# Patient Record
Sex: FEMALE | Race: WHITE | NOT HISPANIC OR LATINO | Employment: UNEMPLOYED | URBAN - METROPOLITAN AREA
[De-identification: names, ages, dates, MRNs, and addresses within clinical notes are randomized per-mention and may not be internally consistent; named-entity substitution may affect disease eponyms.]

---

## 2020-07-07 ENCOUNTER — TELEPHONE (OUTPATIENT)
Dept: OBSTETRICS AND GYNECOLOGY | Facility: CLINIC | Age: 20
End: 2020-07-07

## 2020-07-07 DIAGNOSIS — N92.6 IRREGULAR MENSES: Primary | ICD-10-CM

## 2020-07-07 NOTE — TELEPHONE ENCOUNTER
----- Message from Gilda Jara sent at 7/7/2020  1:58 PM CDT -----  Regarding: question  Contact: [phone]  Would like to consult with the nurse, patient has some Question that she needs to Ask the nurse,  please call back at [phone] our [phone], thanks sj

## 2020-07-07 NOTE — TELEPHONE ENCOUNTER
Pt states she's not supposed to start for another 11 days and she's been having a bloody like discharge, not heavy bleeding. Pt is on OCP Uziel. Pt is wondering if she could've been pregnant and having a miscarriage. Please advise.

## 2020-07-08 ENCOUNTER — TELEPHONE (OUTPATIENT)
Dept: OBSTETRICS AND GYNECOLOGY | Facility: CLINIC | Age: 20
End: 2020-07-08

## 2020-07-08 DIAGNOSIS — N92.6 IRREGULAR MENSES: Primary | ICD-10-CM

## 2020-07-08 LAB — B-HCG SERPL-ACNC: <1.59 MIU/ML

## 2020-07-08 NOTE — TELEPHONE ENCOUNTER
----- Message from Mine Barnes sent at 7/8/2020  9:47 AM CDT -----  Regarding: order request  Contact: [facility]  States that pt is at office to have labs drawn but that they do not have any orders. Please fax orders to [phone]. Please call back at [phone]//thank you acc

## 2020-07-08 NOTE — TELEPHONE ENCOUNTER
----- Message from Shabnam Bernard sent at 7/8/2020  9:59 AM CDT -----  Regarding: lab orders  Contact: Patient  Path lab never received patient's lab orders. Please call patient at Ph .[phone] (home)

## 2020-07-09 ENCOUNTER — TELEPHONE (OUTPATIENT)
Dept: OBSTETRICS AND GYNECOLOGY | Facility: CLINIC | Age: 20
End: 2020-07-09

## 2020-07-09 DIAGNOSIS — Z30.011 ENCOUNTER FOR INITIAL PRESCRIPTION OF CONTRACEPTIVE PILLS: Primary | ICD-10-CM

## 2020-07-09 RX ORDER — NORGESTIMATE AND ETHINYL ESTRADIOL 0.25-0.035
1 KIT ORAL DAILY
Qty: 28 TABLET | Refills: 1 | Status: SHIPPED | OUTPATIENT
Start: 2020-07-09 | End: 2020-08-19

## 2020-07-09 NOTE — TELEPHONE ENCOUNTER
----- Message from Minesh Hernandez sent at 7/9/2020 12:48 PM CDT -----  Contact: self  Type:  Test Results    Who Called: pt  Name of Test (Lab/Mammo/Etc): labs  Date of Test: 07/08  Ordering Provider: chris  Where the test was performed: path lab  Would the patient rather a call back or a response via [facility]? Call back  Best Call Back Number: [phone]  Additional Information:  none

## 2020-07-09 NOTE — TELEPHONE ENCOUNTER
Please notify pt that HCG was negative and that different OCP prescription sent for her to start at the end of this pack. She needs to also use condoms.

## 2020-07-09 NOTE — TELEPHONE ENCOUNTER
----- Message from Angela Bauer sent at 7/9/2020  2:47 PM CDT -----  .Type:  Test Results    Who Called: self  Name of Test (Lab/Mammo/Etc): lab  Date of Test: 7/8  Ordering Provider: chris  Where the test was performed:   Would the patient rather a call back or a response via [facility]? call  Best Call Back Number: .[phone] (home)   Additional Information:

## 2020-08-07 ENCOUNTER — TELEPHONE (OUTPATIENT)
Dept: OBSTETRICS AND GYNECOLOGY | Facility: CLINIC | Age: 20
End: 2020-08-07

## 2020-08-07 NOTE — TELEPHONE ENCOUNTER
Pt states her OCP's were changed and she started 12 days early. Pt states this is the heaviest period she's ever had and she's still bleeding like its the first day. Please advise.

## 2020-08-07 NOTE — TELEPHONE ENCOUNTER
Please have patient discontinue OCP's and use condoms. She needs to call with her first normal period next month and we will try something else

## 2020-08-07 NOTE — TELEPHONE ENCOUNTER
----- Message from Elma Starks sent at 8/7/2020 10:43 AM CDT -----  .Type:  Same Day Appointment Request    Caller is requesting a same day appointment.  Caller declined first available appointment listed below.    Name of Caller: Tessie   When is the first available appointment?   Symptoms: BC problems   Best Call Back Number: [phone]  Additional Information: n/a

## 2020-08-19 ENCOUNTER — OFFICE VISIT (OUTPATIENT)
Dept: OBSTETRICS AND GYNECOLOGY | Facility: CLINIC | Age: 20
End: 2020-08-19
Payer: COMMERCIAL

## 2020-08-19 VITALS
DIASTOLIC BLOOD PRESSURE: 70 MMHG | HEIGHT: 62 IN | WEIGHT: 119 LBS | SYSTOLIC BLOOD PRESSURE: 101 MMHG | BODY MASS INDEX: 21.9 KG/M2 | HEART RATE: 87 BPM

## 2020-08-19 DIAGNOSIS — N92.6 IRREGULAR MENSES: ICD-10-CM

## 2020-08-19 DIAGNOSIS — Z01.419 WELL WOMAN EXAM WITH ROUTINE GYNECOLOGICAL EXAM: Primary | ICD-10-CM

## 2020-08-19 PROCEDURE — 99395 PREV VISIT EST AGE 18-39: CPT | Mod: S$GLB,,, | Performed by: OBSTETRICS & GYNECOLOGY

## 2020-08-19 PROCEDURE — 99395 PR PREVENTIVE VISIT,EST,18-39: ICD-10-PCS | Mod: S$GLB,,, | Performed by: OBSTETRICS & GYNECOLOGY

## 2020-08-19 RX ORDER — DESOGESTREL AND ETHINYL ESTRADIOL AND ETHINYL ESTRADIOL 21-5 (28)
1 KIT ORAL DAILY
Qty: 84 TABLET | Refills: 4 | Status: SHIPPED | OUTPATIENT
Start: 2020-08-19 | End: 2021-02-05

## 2020-08-19 RX ORDER — CLONIDINE HYDROCHLORIDE 0.1 MG/1
TABLET ORAL
COMMUNITY
Start: 2020-06-24 | End: 2021-02-05

## 2020-08-19 NOTE — PROGRESS NOTES
"Tessie Soto is a 20 y.o. female No obstetric history on file. who presents for a well woman exam.  She has no issues, problems, or complaints. She completed Gardasil series at age 16. Pt is off Orthocyclen due to irregular bleeding. Prefers to go back to [facility].    History reviewed. No pertinent past medical history.    History reviewed. No pertinent surgical history.    OB History    Para Term  AB Living   0 0 0 0 0 0   SAB TAB Ectopic Multiple Live Births   0 0 0 0 0       History reviewed. No pertinent family history.    Social History     Tobacco Use    Smoking status: Never Smoker   Substance Use Topics    Alcohol use: Never     Frequency: Never    Drug use: Never         Current Outpatient Medications:     cloNIDine (CATAPRES) 0.1 MG tablet, , Disp: , Rfl:     VIORELE, 28, 0.15-0.02 mgx21 /0.01 mg x 5 per tablet, Take 1 tablet by mouth once daily., Disp: 84 tablet, Rfl: 4     Review of patient's allergies indicates:  No Known Allergies     ROS:  GENERAL: Denies weight gain or weight loss. Feeling well overall.   SKIN: Denies rash or lesions.   HEAD: Denies head injury or headache.   NODES: Denies enlarged lymph nodes.   CHEST: Denies shortness of breath.   CARDIOVASCULAR: Denies palpitations or chest pain.   ABDOMEN: Denies abdominal pain, constipation, diarrhea, nausea, vomiting or rectal bleeding.   URINARY: Denies frequency, dysuria, hematuria, or burning on urination.  REPRODUCTIVE: See HPI.   BREASTS: Denies pain, lumps, or nipple discharge.   HEMATOLOGIC: Denies easy bruisability or excessive bleeding.  MUSCULOSKELETAL: Denies joint pain or swelling.   NEUROLOGIC: Denies syncope or weakness.   PSYCHIATRIC: Denies depression, anxiety or mood swings.    PHYSICAL EXAM:    /70   Pulse 87   Ht 5' 2" (1.575 m)   Wt 54 kg (119 lb)   LMP 2020 (Exact Date)   BMI 21.77 kg/m²    Body mass index is 21.77 kg/m².     APPEARANCE: Well nourished, well developed, in no acute " distress.  AFFECT: WNL, alert and oriented x 3  SKIN: No acne or hirsutism  NECK: Neck symmetric without masses or thyromegaly  NODES: No inguinal, cervical, axillary, or femoral lymph node enlargement  CHEST: Good respiratory effect  ABDOMEN: Soft.  No tenderness or masses.  No hepatosplenomegaly.  No hernias.  BREASTS: Symmetrical, no skin changes or visible lesions.  No palpable masses, nipple discharge bilaterally.  PELVIC: Normal external genitalia without lesions.  Normal hair distribution.  Adequate perineal body, normal urethral meatus.  Urethra and bladder without tenderness or masses. Vagina moist and well rugated without lesions or discharge.  Cervix pink, without lesions, discharge or tenderness.  No significant cystocele or rectocele.  Bimanual exam shows uterus to be normal size, regular, mobile and nontender.  Adnexa without masses or tenderness.    EXTREMITIES: No edema.     Well woman exam with routine gynecological exam  -     C. trachomatis/N. gonorrhoeae by AMP DNA Other; Cervicovaginal    Irregular menses  -     VIORELE, 28, 0.15-0.02 mgx21 /0.01 mg x 5 per tablet; Take 1 tablet by mouth once daily.  Dispense: 84 tablet; Refill: 4         Patient was counseled today on A.C.S. Pap guidelines and recommendations for yearly pelvic exams, mammograms and monthly self breast exams; to see her PCP for other health maintenance.     Follow up in 1 year

## 2020-08-22 LAB
CHLAMYDIA: POSITIVE
GONORRHEA: NEGATIVE
SOURCE: ABNORMAL

## 2020-08-24 ENCOUNTER — TELEPHONE (OUTPATIENT)
Dept: OBSTETRICS AND GYNECOLOGY | Facility: CLINIC | Age: 20
End: 2020-08-24

## 2020-08-24 DIAGNOSIS — A74.9 CHLAMYDIA: Primary | ICD-10-CM

## 2020-08-24 RX ORDER — AZITHROMYCIN 250 MG/1
TABLET, FILM COATED ORAL
Qty: 4 TABLET | Refills: 0 | Status: SHIPPED | OUTPATIENT
Start: 2020-08-24 | End: 2020-08-29

## 2020-08-24 NOTE — TELEPHONE ENCOUNTER
----- Message from Megan Murillo MD sent at 8/24/2020  7:45 AM CDT -----  Please notify pt of chlamydia infection. Prescription sent. Needs appt in 4-6 weeks for repeat test. Needs to notify her partner so they can seek treatment. Abstain from intercourse until after adequate treatment confirmed with next test.

## 2020-09-29 ENCOUNTER — TELEPHONE (OUTPATIENT)
Dept: OBSTETRICS AND GYNECOLOGY | Facility: CLINIC | Age: 20
End: 2020-09-29

## 2020-09-29 NOTE — TELEPHONE ENCOUNTER
Attempted to contact patient, no answer. Left patient voice mail to return call to clinic to r/s her FEL[facility] appt.

## 2021-01-26 ENCOUNTER — PATIENT MESSAGE (OUTPATIENT)
Dept: OBSTETRICS AND GYNECOLOGY | Facility: CLINIC | Age: 21
End: 2021-01-26

## 2021-02-05 ENCOUNTER — OFFICE VISIT (OUTPATIENT)
Dept: OBSTETRICS AND GYNECOLOGY | Facility: CLINIC | Age: 21
End: 2021-02-05
Payer: COMMERCIAL

## 2021-02-05 DIAGNOSIS — A74.9 CHLAMYDIA: Primary | ICD-10-CM

## 2021-02-05 DIAGNOSIS — Z32.01 POSITIVE URINE PREGNANCY TEST: ICD-10-CM

## 2021-02-05 LAB
B-HCG SERPL-ACNC: 142 MIU/ML
PROGEST SERPL-MCNC: 22.9 NG/ML

## 2021-02-05 PROCEDURE — 99213 OFFICE O/P EST LOW 20 MIN: CPT | Mod: S$GLB,,, | Performed by: OBSTETRICS & GYNECOLOGY

## 2021-02-05 PROCEDURE — 99213 PR OFFICE/OUTPT VISIT, EST, LEVL III, 20-29 MIN: ICD-10-PCS | Mod: S$GLB,,, | Performed by: OBSTETRICS & GYNECOLOGY

## 2021-02-05 RX ORDER — CYCLOBENZAPRINE HCL 10 MG
TABLET ORAL
COMMUNITY
Start: 2020-10-29

## 2021-02-05 RX ORDER — SERTRALINE HYDROCHLORIDE 50 MG/1
50 TABLET, FILM COATED ORAL DAILY
COMMUNITY
Start: 2021-01-13

## 2021-02-05 RX ORDER — IBUPROFEN 600 MG/1
TABLET ORAL
COMMUNITY
Start: 2020-10-29

## 2021-02-08 ENCOUNTER — PATIENT MESSAGE (OUTPATIENT)
Dept: OBSTETRICS AND GYNECOLOGY | Facility: CLINIC | Age: 21
End: 2021-02-08

## 2021-02-09 ENCOUNTER — TELEPHONE (OUTPATIENT)
Dept: OBSTETRICS AND GYNECOLOGY | Facility: CLINIC | Age: 21
End: 2021-02-09

## 2021-02-09 LAB
CHLAMYDIA: NEGATIVE
GONORRHEA: NEGATIVE
SOURCE: NORMAL

## 2021-02-10 ENCOUNTER — TELEPHONE (OUTPATIENT)
Dept: OBSTETRICS AND GYNECOLOGY | Facility: CLINIC | Age: 21
End: 2021-02-10

## 2021-02-10 DIAGNOSIS — Z32.01 POSITIVE URINE PREGNANCY TEST: Primary | ICD-10-CM
